# Patient Record
Sex: MALE | Race: WHITE | NOT HISPANIC OR LATINO | Employment: OTHER | URBAN - METROPOLITAN AREA
[De-identification: names, ages, dates, MRNs, and addresses within clinical notes are randomized per-mention and may not be internally consistent; named-entity substitution may affect disease eponyms.]

---

## 2021-11-02 ENCOUNTER — OFFICE VISIT (OUTPATIENT)
Dept: PODIATRY | Facility: CLINIC | Age: 21
End: 2021-11-02

## 2021-11-02 VITALS
TEMPERATURE: 97.3 F | DIASTOLIC BLOOD PRESSURE: 92 MMHG | BODY MASS INDEX: 31.08 KG/M2 | SYSTOLIC BLOOD PRESSURE: 109 MMHG | OXYGEN SATURATION: 100 % | WEIGHT: 193.4 LBS | HEIGHT: 66 IN | HEART RATE: 88 BPM

## 2021-11-02 DIAGNOSIS — M79.671 FOOT PAIN, RIGHT: Primary | ICD-10-CM

## 2021-11-02 DIAGNOSIS — L03.031 PARONYCHIA OF TOE OF RIGHT FOOT: ICD-10-CM

## 2021-11-02 DIAGNOSIS — L60.0 ONYCHOCRYPTOSIS: ICD-10-CM

## 2021-11-02 PROCEDURE — 99203 OFFICE O/P NEW LOW 30 MIN: CPT | Performed by: PODIATRIST

## 2021-11-02 PROCEDURE — 11730 AVULSION NAIL PLATE SIMPLE 1: CPT | Performed by: PODIATRIST

## 2021-11-02 NOTE — PROGRESS NOTES
[facility] - PODIATRY    Today's Date: 11/02/21    Patient Name: Juan Carlos Hill  MRN: [id number]  CSN: [id number]  PCP: Tricia Horan APRN, last PCP visit: Early October 2021  Referring Provider: No ref. provider found    SUBJECTIVE     Chief Complaint   Patient presents with   • Right Foot - Ingrown Toenail     HPI: Juan Carlos Hill, a 21 y.o.male, comes to clinic.    New, Established, New Problem:  New  Location:  Medial or lateral borders of right first toe  Duration:   October 2021  Onset:  Gradual  Nature:  sore with palpation.  Stable, worsening, improving:   Worsening  Aggravating factors:  Pain with shoe gear and ambulation.  Previous Treatment:  Completed p.o. antibiotics    No other pedal complaints at this time.    Patient's had a stroke and presents with his father.  Patient works at [employer].    Patient denies any fevers, chills, nausea, vomiting, shortness of breath, nor any other constitutional signs nor symptoms.       Past Medical History:   Diagnosis Date   • Hydrocephalus (HCC)    • Ingrown toenail    • Seizures (HCC)    • Stroke (HCC)      Past Surgical History:   Procedure Laterality Date   • CSF SHUNT       Family History   Problem Relation Age of Onset   • Cancer Paternal Grandmother    • Cancer Paternal Grandfather      Social History     Socioeconomic History   • Marital status: Single   Tobacco Use   • Smoking status: Never Smoker   • Smokeless tobacco: Never Used   Vaping Use   • Vaping Use: Never used   Substance and Sexual Activity   • Alcohol use: Never   • Drug use: Never   • Sexual activity: Defer     No Known Allergies  No current outpatient medications on file.     No current facility-administered medications for this visit.     Review of Systems   Constitutional: Negative.    Skin:        Painful Right in-grown toenail   All other systems reviewed and are negative.      OBJECTIVE     Vitals:    11/02/21 1039   BP: 109/92   Pulse: 88   Temp:  97.3 °F (36.3 °C)   SpO2: 100%       PHYSICAL EXAM    Accompanied by father.     Foot/Ankle Exam:       General:   Appearance: appears stated age and healthy    Orientation: AAOx3    Affect: appropriate    Gait: unimpaired      VASCULAR      Right Foot Vascularity   Normal vascular exam    Dorsalis pedis:  2+  Posterior tibial:  2+  Skin Temperature: warm    Edema Grading:  None  CFT:  < 3 seconds  Pedal Hair Growth:  Present  Varicosities: none       Left Foot Vascularity   Normal vascular exam    Dorsalis pedis:  2+  Posterior tibial:  2+  Skin Temperature: warm    Edema Grading:  None  CFT:  < 3 seconds  Pedal Hair Growth:  Present  Varicosities: none        NEUROLOGIC     Right Foot Neurologic   Normal sensation    Light touch sensation:  Normal  Vibratory sensation:  Normal  Hot/Cold sensation: normal       Left Foot Neurologic   Normal sensation    Light touch sensation:  Normal  Vibratory sensation:  Normal  Hot/cold sensation: normal       MUSCLE STRENGTH     Right Foot Muscle Strength   Normal strength    Foot dorsiflexion:  5  Foot plantar flexion:  5  Foot inversion:  5  Foot eversion:  5     Left Foot Muscle Strength   Normal strength    Foot dorsiflexion:  5  Foot plantar flexion:  5  Foot inversion:  5  Foot eversion:  5     RANGE OF MOTION      Right Foot Range of Motion   Foot and ankle ROM within normal limits       Left Foot Range of Motion   Foot and ankle ROM within normal limits       DERMATOLOGIC     Right Foot Dermatologic   Skin: skin intact    Nails: ingrown toenail and paronychia    Nails comment:  Right 1st bilateral nail borders     Left Foot Dermatologic   Skin: skin intact    Nails: normal        ASSESSMENT/PLAN     Diagnoses and all orders for this visit:    1. Foot pain, right (Primary)    2. Onychocryptosis    3. Paronychia of toe of right foot      Comprehensive lower extremity examination and evaluation was performed.    Discussed findings and treatment plan including risks,  benefits, and treatment options with patient in detail. Patient agreed with treatment plan.    Incision and Drainage procedure is indicated for onychocryptosis of the Right 1st medial and lateral nail border(s). Indications, risks and benefits and alternative treatments have been discussed with this patient who has agreed to this procedure. The area was sterilely prepped with a povidone-iodine solution. The affected area was locally anesthetized with 3ml, of lidocaine 1%. The offending nail plate was completely excised.  This was followed by irrigation with copious amounts of isopropyl alcohol.  A sterile dressing was applied. The patient tolerated the procedure well.     Patient was given post procedure care instructions.  The patient states understanding and agreement with this plan.    An After Visit Summary was printed and given to the patient at discharge, including (if requested) any available informative/educational handouts regarding diagnosis, treatment, or medications. All questions were answered to patient/family satisfaction. Should symptoms fail to improve or worsen they agree to call or return to clinic or to go to the Emergency Department. Discussed the importance of following up with any needed screening tests/labs/specialist appointments and any requested follow-up recommended by me today. Importance of maintaining follow-up discussed and patient accepts that missed appointments can delay diagnosis and potentially lead to worsening of conditions.    Return in about 2 weeks (around 11/16/2021) for P & A procedure., or sooner if acute issues arise.    This document has been electronically signed by Hernandez Negron DPM on November 2, 2021 11:08 EDT

## 2021-11-18 ENCOUNTER — OFFICE VISIT (OUTPATIENT)
Dept: PODIATRY | Facility: CLINIC | Age: 21
End: 2021-11-18

## 2021-11-18 VITALS
HEIGHT: 66 IN | SYSTOLIC BLOOD PRESSURE: 105 MMHG | OXYGEN SATURATION: 99 % | DIASTOLIC BLOOD PRESSURE: 51 MMHG | BODY MASS INDEX: 31.18 KG/M2 | HEART RATE: 55 BPM | TEMPERATURE: 97.3 F | WEIGHT: 194 LBS

## 2021-11-18 DIAGNOSIS — L03.031 PARONYCHIA OF TOE OF RIGHT FOOT: ICD-10-CM

## 2021-11-18 DIAGNOSIS — M79.671 FOOT PAIN, RIGHT: Primary | ICD-10-CM

## 2021-11-18 DIAGNOSIS — L60.0 ONYCHOCRYPTOSIS: ICD-10-CM

## 2021-11-18 PROCEDURE — 11750 EXCISION NAIL&NAIL MATRIX: CPT | Performed by: PODIATRIST

## 2021-11-18 RX ORDER — LEVETIRACETAM 500 MG/1
500 TABLET ORAL 2 TIMES DAILY
COMMUNITY
Start: 2021-10-18

## 2021-11-18 RX ORDER — PYRIDOXINE HCL (VITAMIN B6) 25 MG
50 TABLET ORAL DAILY
COMMUNITY

## 2021-11-18 NOTE — PROGRESS NOTES
[facility]    Today's Date: 11/18/21    Patient Name: Juan Carlos Hill  MRN: [id number]  CSN: [id number]  PCP: Tricia Horan APRN  Referring Provider: No ref. provider found    SUBJECTIVE     Chief Complaint   Patient presents with   • Right Foot - Ingrown Toenail     Had I&D, today P&A     HPI: Juan Carlos Hill, a 21 y.o.male, comes to clinic.    New, Established, New Problem:  Established  Location:  Medial lateral borders of right first toe  Duration:   Greater than 1 month  Onset:  Gradual  Nature:  sore with palpation.  Stable, worsening, improving:   Improving  Aggravating factors:  Pain with shoe gear and ambulation.  Previous Treatment:  I&D on last visit    No other pedal complaints at this time.    Patient denies any fevers, chills, nausea, vomiting, shortness of breath, nor any other constitutional signs nor symptoms.       Past Medical History:   Diagnosis Date   • Hydrocephalus (HCC)    • Ingrown toenail    • Seizures (HCC)    • Stroke (HCC)      Past Surgical History:   Procedure Laterality Date   • CSF SHUNT       Family History   Problem Relation Age of Onset   • Cancer Paternal Grandmother    • Cancer Paternal Grandfather      Social History     Socioeconomic History   • Marital status: Single   Tobacco Use   • Smoking status: Never Smoker   • Smokeless tobacco: Never Used   Vaping Use   • Vaping Use: Never used   Substance and Sexual Activity   • Alcohol use: Never   • Drug use: Never   • Sexual activity: Defer     No Known Allergies  Current Outpatient Medications   Medication Sig Dispense Refill   • levETIRAcetam (KEPPRA) 500 MG tablet Take 500 mg by mouth 2 (Two) Times a Day.     • pyridoxine (VITAMIN B-6) 25 MG tablet Take 50 mg by mouth Daily.       No current facility-administered medications for this visit.     Review of Systems   Constitutional: Negative.    Skin:        Painful Right in-grown toenail   All other systems reviewed and are  negative.      OBJECTIVE     Vitals:    11/18/21 0743   BP: 105/51   Pulse: 55   Temp: 97.3 °F (36.3 °C)   SpO2: 99%       PHYSICAL EXAM  GEN:   Accompanied by father.     Foot/Ankle Exam:       General:   Appearance: appears stated age and healthy    Affect: appropriate    Gait: unimpaired      VASCULAR      Right Foot Vascularity   Normal vascular exam    Dorsalis pedis:  2+  Posterior tibial:  2+  Skin Temperature: warm    Edema Grading:  None  CFT:  < 3 seconds  Pedal Hair Growth:  Present  Varicosities: none       Left Foot Vascularity   Normal vascular exam    Dorsalis pedis:  2+  Posterior tibial:  2+  Skin Temperature: warm    Edema Grading:  None  CFT:  < 3 seconds  Pedal Hair Growth:  Present  Varicosities: none        NEUROLOGIC     Right Foot Neurologic   Normal sensation    Light touch sensation:  Normal  Vibratory sensation:  Normal  Hot/Cold sensation: normal       Left Foot Neurologic   Normal sensation    Light touch sensation:  Normal  Vibratory sensation:  Normal  Hot/cold sensation: normal       MUSCLE STRENGTH     Right Foot Muscle Strength   Normal strength    Foot dorsiflexion:  5  Foot plantar flexion:  5  Foot inversion:  5  Foot eversion:  5     Left Foot Muscle Strength   Normal strength    Foot dorsiflexion:  5  Foot plantar flexion:  5  Foot inversion:  5  Foot eversion:  5     RANGE OF MOTION      Right Foot Range of Motion   Foot and ankle ROM within normal limits       Left Foot Range of Motion   Foot and ankle ROM within normal limits       DERMATOLOGIC     Right Foot Dermatologic   Skin: skin intact    Nails: ingrown toenail and paronychia    Nails comment:  Right 1st bilateral nail borders     Left Foot Dermatologic   Skin: skin intact    Nails: normal        ASSESSMENT/PLAN     Diagnoses and all orders for this visit:    1. Foot pain, right (Primary)    2. Onychocryptosis    3. Paronychia of toe of right foot      Comprehensive lower extremity examination and evaluation was  performed.    Discussed findings and treatment plan including risks, benefits, and treatment options with patient in detail. Patient agreed with treatment plan.    Phenol and Alcohol Chemical Matrixectomy Procedure - This procedure is indicated for onychocryptosis of the right first and medial nail border(s). Indications, risks and benefits and alternative treatments have been discussed with this patient who has agreed to this procedure. The area was sterilely prepped with a povidone-iodine solution. The affected area was locally anesthetized with 3 ml, of lidocaine 1%. The offending nail plate was completely excised.  Next 3 applications of 89% phenol were applied to the matrix area x 30 seconds followed by irrigation with copious amounts of isopropyl alcohol.  A sterile dressing was applied. The patient tolerated the procedure well.     Patient was given post procedure care instructions.  The patient states understanding and agreement with this plan.    An After Visit Summary was printed and given to the patient at discharge, including (if requested) any available informative/educational handouts regarding diagnosis, treatment, or medications. All questions were answered to patient/family satisfaction. Should symptoms fail to improve or worsen they agree to call or return to clinic or to go to the Emergency Department. Discussed the importance of following up with any needed screening tests/labs/specialist appointments and any requested follow-up recommended by me today. Importance of maintaining follow-up discussed and patient accepts that missed appointments can delay diagnosis and potentially lead to worsening of conditions.    Return in about 2 weeks (around 12/2/2021) for Post-Procedure., or sooner if acute issues arise.    This document has been electronically signed by Hernandez Negron DPM on November 18, 2021 08:04 EST

## 2021-12-06 ENCOUNTER — OFFICE VISIT (OUTPATIENT)
Dept: PODIATRY | Facility: CLINIC | Age: 21
End: 2021-12-06

## 2021-12-06 VITALS
TEMPERATURE: 97.1 F | DIASTOLIC BLOOD PRESSURE: 63 MMHG | BODY MASS INDEX: 31.02 KG/M2 | HEIGHT: 66 IN | OXYGEN SATURATION: 98 % | WEIGHT: 193 LBS | SYSTOLIC BLOOD PRESSURE: 126 MMHG | HEART RATE: 72 BPM

## 2021-12-06 DIAGNOSIS — M79.671 FOOT PAIN, RIGHT: Primary | ICD-10-CM

## 2021-12-06 DIAGNOSIS — L03.031 PARONYCHIA OF TOE OF RIGHT FOOT: ICD-10-CM

## 2021-12-06 DIAGNOSIS — L60.0 ONYCHOCRYPTOSIS: ICD-10-CM

## 2021-12-06 PROCEDURE — 99212 OFFICE O/P EST SF 10 MIN: CPT | Performed by: PODIATRIST

## 2021-12-06 NOTE — PROGRESS NOTES
[facility] - PODIATRY    Today's Date: 12/06/21    Patient Name: Juan Carlos Hill  MRN: [id number]  CSN: [id number]  PCP: Tricia Horan APRN  Referring Provider: No ref. provider found    SUBJECTIVE     Chief Complaint   Patient presents with   • Right Foot - Follow-up, Ingrown Toenail     HPI: Juan Carlos Hill, a 21 y.o.male, comes to clinic.    New, Established, New Problem:  Established  Location:  Medial lateral borders of right first toe  Duration:   Greater than 1 month  Onset:  Gradual  Nature:  sore with palpation.  Stable, worsening, improving:   Improving  Aggravating factors:  Pain with shoe gear and ambulation.  Previous Treatment:  Chemical matrixectomy    No other pedal complaints at this time.    Patient denies any fevers, chills, nausea, vomiting, shortness of breath, nor any other constitutional signs nor symptoms.       Past Medical History:   Diagnosis Date   • Hydrocephalus (HCC)    • Ingrown toenail    • Seizures (HCC)    • Stroke (HCC)      Past Surgical History:   Procedure Laterality Date   • CSF SHUNT       Family History   Problem Relation Age of Onset   • Cancer Paternal Grandmother    • Cancer Paternal Grandfather      Social History     Socioeconomic History   • Marital status: Single   Tobacco Use   • Smoking status: Never Smoker   • Smokeless tobacco: Never Used   Vaping Use   • Vaping Use: Never used   Substance and Sexual Activity   • Alcohol use: Never   • Drug use: Never   • Sexual activity: Defer     No Known Allergies  Current Outpatient Medications   Medication Sig Dispense Refill   • levETIRAcetam (KEPPRA) 500 MG tablet Take 500 mg by mouth 2 (Two) Times a Day.     • pyridoxine (VITAMIN B-6) 25 MG tablet Take 50 mg by mouth Daily.       No current facility-administered medications for this visit.     Review of Systems   Constitutional: Negative.    Skin:        Right in-grown toenail   All other systems reviewed and are negative.      OBJECTIVE      Vitals:    12/06/21 0736   BP: 126/63   Pulse: 72   Temp: 97.1 °F (36.2 °C)   SpO2: 98%       PHYSICAL EXAM  GEN:   Accompanied by father.     Foot/Ankle Exam:       General:   Appearance: appears stated age and healthy    Affect: appropriate    Gait: unimpaired      VASCULAR      Right Foot Vascularity   Normal vascular exam    Dorsalis pedis:  2+  Posterior tibial:  2+  Skin Temperature: warm    Edema Grading:  None  CFT:  < 3 seconds  Pedal Hair Growth:  Present  Varicosities: none       Left Foot Vascularity   Normal vascular exam    Dorsalis pedis:  2+  Posterior tibial:  2+  Skin Temperature: warm    Edema Grading:  None  CFT:  < 3 seconds  Pedal Hair Growth:  Present  Varicosities: none        NEUROLOGIC     Right Foot Neurologic   Normal sensation    Light touch sensation:  Normal  Vibratory sensation:  Normal  Hot/Cold sensation: normal       Left Foot Neurologic   Normal sensation    Light touch sensation:  Normal  Vibratory sensation:  Normal  Hot/cold sensation: normal       MUSCLE STRENGTH     Right Foot Muscle Strength   Normal strength    Foot dorsiflexion:  5  Foot plantar flexion:  5  Foot inversion:  5  Foot eversion:  5     Left Foot Muscle Strength   Normal strength    Foot dorsiflexion:  5  Foot plantar flexion:  5  Foot inversion:  5  Foot eversion:  5     RANGE OF MOTION      Right Foot Range of Motion   Foot and ankle ROM within normal limits       Left Foot Range of Motion   Foot and ankle ROM within normal limits       DERMATOLOGIC     Right Foot Dermatologic   Skin: skin intact    Nails: no ingrown toenail and no paronychia    Nails comment:  Right 1st bilateral nail borders; healing without complications.     Left Foot Dermatologic   Skin: skin intact    Nails: normal        ASSESSMENT/PLAN     Diagnoses and all orders for this visit:    1. Foot pain, right (Primary)    2. Onychocryptosis    3. Paronychia of toe of right foot      Comprehensive lower extremity examination and  evaluation was performed.    Patient is to monitor for recurrence and any new symptoms and to contact Dr. Negron's office for a follow-up appointment.      The patient states understanding and agreement with this plan.    An After Visit Summary was printed and given to the patient at discharge, including (if requested) any available informative/educational handouts regarding diagnosis, treatment, or medications. All questions were answered to patient/family satisfaction. Should symptoms fail to improve or worsen they agree to call or return to clinic or to go to the Emergency Department. Discussed the importance of following up with any needed screening tests/labs/specialist appointments and any requested follow-up recommended by me today. Importance of maintaining follow-up discussed and patient accepts that missed appointments can delay diagnosis and potentially lead to worsening of conditions.    Return if symptoms worsen or fail to improve., or sooner if acute issues arise.    This document has been electronically signed by Hernandez Negron DPM on December 6, 2021 07:56 EST

## 2023-05-30 ENCOUNTER — APPOINTMENT (OUTPATIENT)
Dept: GENERAL RADIOLOGY | Facility: HOSPITAL | Age: 23
End: 2023-05-30

## 2023-05-30 ENCOUNTER — HOSPITAL ENCOUNTER (EMERGENCY)
Facility: HOSPITAL | Age: 23
Discharge: ANOTHER HOSPITAL | End: 2023-05-30
Attending: EMERGENCY MEDICINE | Admitting: EMERGENCY MEDICINE

## 2023-05-30 ENCOUNTER — APPOINTMENT (OUTPATIENT)
Dept: CT IMAGING | Facility: HOSPITAL | Age: 23
End: 2023-05-30

## 2023-05-30 VITALS
HEIGHT: 66 IN | RESPIRATION RATE: 18 BRPM | SYSTOLIC BLOOD PRESSURE: 115 MMHG | HEART RATE: 77 BPM | OXYGEN SATURATION: 97 % | WEIGHT: 192.9 LBS | DIASTOLIC BLOOD PRESSURE: 71 MMHG | TEMPERATURE: 97.8 F | BODY MASS INDEX: 31 KG/M2

## 2023-05-30 DIAGNOSIS — R26.9 GAIT ABNORMALITY: ICD-10-CM

## 2023-05-30 DIAGNOSIS — H53.9 VISION CHANGES: ICD-10-CM

## 2023-05-30 DIAGNOSIS — Z98.2 VP (VENTRICULOPERITONEAL) SHUNT STATUS: Primary | ICD-10-CM

## 2023-05-30 LAB
ABO GROUP BLD: NORMAL
ABO GROUP BLD: NORMAL
ALBUMIN SERPL-MCNC: 4.1 G/DL (ref 3.5–5.2)
ALBUMIN/GLOB SERPL: 1.3 G/DL
ALP SERPL-CCNC: 133 U/L (ref 39–117)
ALT SERPL W P-5'-P-CCNC: 15 U/L (ref 1–41)
ANION GAP SERPL CALCULATED.3IONS-SCNC: 10.5 MMOL/L (ref 5–15)
APTT PPP: 29.3 SECONDS (ref 24.2–34.2)
AST SERPL-CCNC: 13 U/L (ref 1–40)
BASOPHILS # BLD AUTO: 0.03 10*3/MM3 (ref 0–0.2)
BASOPHILS NFR BLD AUTO: 0.3 % (ref 0–1.5)
BILIRUB SERPL-MCNC: 0.3 MG/DL (ref 0–1.2)
BLD GP AB SCN SERPL QL: NEGATIVE
BUN SERPL-MCNC: 10 MG/DL (ref 6–20)
BUN/CREAT SERPL: 11.6 (ref 7–25)
CALCIUM SPEC-SCNC: 9.3 MG/DL (ref 8.6–10.5)
CHLORIDE SERPL-SCNC: 99 MMOL/L (ref 98–107)
CO2 SERPL-SCNC: 26.5 MMOL/L (ref 22–29)
CREAT SERPL-MCNC: 0.86 MG/DL (ref 0.76–1.27)
DEPRECATED RDW RBC AUTO: 40.6 FL (ref 37–54)
EGFRCR SERPLBLD CKD-EPI 2021: 123.2 ML/MIN/1.73
EOSINOPHIL # BLD AUTO: 0.14 10*3/MM3 (ref 0–0.4)
EOSINOPHIL NFR BLD AUTO: 1.3 % (ref 0.3–6.2)
ERYTHROCYTE [DISTWIDTH] IN BLOOD BY AUTOMATED COUNT: 12.9 % (ref 12.3–15.4)
GLOBULIN UR ELPH-MCNC: 3.2 GM/DL
GLUCOSE BLDC GLUCOMTR-MCNC: 121 MG/DL (ref 70–99)
GLUCOSE SERPL-MCNC: 99 MG/DL (ref 65–99)
HCT VFR BLD AUTO: 43.5 % (ref 37.5–51)
HGB BLD-MCNC: 14.9 G/DL (ref 13–17.7)
HOLD SPECIMEN: NORMAL
HOLD SPECIMEN: NORMAL
IMM GRANULOCYTES # BLD AUTO: 0.04 10*3/MM3 (ref 0–0.05)
IMM GRANULOCYTES NFR BLD AUTO: 0.4 % (ref 0–0.5)
INR PPP: 1.03 (ref 0.86–1.15)
LYMPHOCYTES # BLD AUTO: 1.38 10*3/MM3 (ref 0.7–3.1)
LYMPHOCYTES NFR BLD AUTO: 12.4 % (ref 19.6–45.3)
MCH RBC QN AUTO: 29.6 PG (ref 26.6–33)
MCHC RBC AUTO-ENTMCNC: 34.3 G/DL (ref 31.5–35.7)
MCV RBC AUTO: 86.5 FL (ref 79–97)
MONOCYTES # BLD AUTO: 1.01 10*3/MM3 (ref 0.1–0.9)
MONOCYTES NFR BLD AUTO: 9.1 % (ref 5–12)
NEUTROPHILS NFR BLD AUTO: 76.5 % (ref 42.7–76)
NEUTROPHILS NFR BLD AUTO: 8.53 10*3/MM3 (ref 1.7–7)
NRBC BLD AUTO-RTO: 0 /100 WBC (ref 0–0.2)
PLATELET # BLD AUTO: 370 10*3/MM3 (ref 140–450)
PMV BLD AUTO: 8.1 FL (ref 6–12)
POTASSIUM SERPL-SCNC: 3.7 MMOL/L (ref 3.5–5.2)
PROT SERPL-MCNC: 7.3 G/DL (ref 6–8.5)
PROTHROMBIN TIME: 13.6 SECONDS (ref 11.8–14.9)
RBC # BLD AUTO: 5.03 10*6/MM3 (ref 4.14–5.8)
RH BLD: NEGATIVE
RH BLD: NEGATIVE
SODIUM SERPL-SCNC: 136 MMOL/L (ref 136–145)
T&S EXPIRATION DATE: NORMAL
TROPONIN T SERPL HS-MCNC: 6 NG/L
WBC NRBC COR # BLD: 11.13 10*3/MM3 (ref 3.4–10.8)
WHOLE BLOOD HOLD COAG: NORMAL
WHOLE BLOOD HOLD SPECIMEN: NORMAL

## 2023-05-30 PROCEDURE — 85025 COMPLETE CBC W/AUTO DIFF WBC: CPT | Performed by: EMERGENCY MEDICINE

## 2023-05-30 PROCEDURE — 70250 X-RAY EXAM OF SKULL: CPT

## 2023-05-30 PROCEDURE — 86901 BLOOD TYPING SEROLOGIC RH(D): CPT | Performed by: EMERGENCY MEDICINE

## 2023-05-30 PROCEDURE — 74018 RADEX ABDOMEN 1 VIEW: CPT

## 2023-05-30 PROCEDURE — 85730 THROMBOPLASTIN TIME PARTIAL: CPT | Performed by: EMERGENCY MEDICINE

## 2023-05-30 PROCEDURE — 93005 ELECTROCARDIOGRAM TRACING: CPT | Performed by: EMERGENCY MEDICINE

## 2023-05-30 PROCEDURE — 82948 REAGENT STRIP/BLOOD GLUCOSE: CPT

## 2023-05-30 PROCEDURE — 86900 BLOOD TYPING SEROLOGIC ABO: CPT | Performed by: EMERGENCY MEDICINE

## 2023-05-30 PROCEDURE — 71046 X-RAY EXAM CHEST 2 VIEWS: CPT

## 2023-05-30 PROCEDURE — 85610 PROTHROMBIN TIME: CPT | Performed by: EMERGENCY MEDICINE

## 2023-05-30 PROCEDURE — 84484 ASSAY OF TROPONIN QUANT: CPT | Performed by: EMERGENCY MEDICINE

## 2023-05-30 PROCEDURE — 70450 CT HEAD/BRAIN W/O DYE: CPT

## 2023-05-30 PROCEDURE — 80053 COMPREHEN METABOLIC PANEL: CPT | Performed by: EMERGENCY MEDICINE

## 2023-05-30 PROCEDURE — 86850 RBC ANTIBODY SCREEN: CPT | Performed by: EMERGENCY MEDICINE

## 2023-05-30 PROCEDURE — 99285 EMERGENCY DEPT VISIT HI MDM: CPT

## 2023-05-30 RX ORDER — SODIUM CHLORIDE 0.9 % (FLUSH) 0.9 %
10 SYRINGE (ML) INJECTION AS NEEDED
Status: DISCONTINUED | OUTPATIENT
Start: 2023-05-30 | End: 2023-05-30 | Stop reason: HOSPADM

## 2023-05-30 NOTE — ED PROVIDER NOTES
"Time: 7:43 AM EDT  Date of encounter:  5/30/2023  Independent Historian/ClinHistory is limited by: Cognitive Impairment    Chief complaint: Gait imbalance and vision changes.    Historians: Patient and his father    History of Present Illness:  Patient is a 23 y.o. year old male who presents to the emergency department for evaluation of worsening gait imbalance since yesterday morning, vision changes today.  Most of the history is relayed by the father due to patient being cognitively impaired.  Dad states that patient is \"a little wobbly all the time\", however since waking up yesterday morning has greater than 80% worsening of his gait and balance.  Then this morning while brushing his teeth patient notes bilateral vision changes.  History very much limited aside from these 2 history points.  Dad does state he feels his son has not been medically sick in any way in the last few days, specifically denying fever, cough, vomiting or diarrhea.    HPI    Patient Care Team  Primary Care Provider: Provider, Tiesha Known    Past Medical History:     No Known Allergies  Past Medical History:   Diagnosis Date   • Hydrocephalus    • Ingrown toenail    • Seizures    • Stroke      Past Surgical History:   Procedure Laterality Date   • CSF SHUNT       Family History   Problem Relation Age of Onset   • Cancer Paternal Grandmother    • Cancer Paternal Grandfather        Home Medications:  Prior to Admission medications    Medication Sig Start Date End Date Taking? Authorizing Provider   levETIRAcetam (KEPPRA) 500 MG tablet Take 500 mg by mouth 2 (Two) Times a Day. 10/18/21   ProviderElise MD   pyridoxine (VITAMIN B-6) 25 MG tablet Take 50 mg by mouth Daily.    ProviderElise MD        Social History:   Social History     Tobacco Use   • Smoking status: Never   • Smokeless tobacco: Never   Vaping Use   • Vaping Use: Never used   Substance Use Topics   • Alcohol use: Never   • Drug use: Never         Review of " "Systems:  Review of Systems   Constitutional: Negative for fever.   Eyes: Positive for visual disturbance.   Respiratory: Negative for cough.    Gastrointestinal: Negative for diarrhea and vomiting.   Neurological: Positive for light-headedness.        Physical Exam:  /78   Pulse 77   Temp 97.8 °F (36.6 °C) (Oral)   Resp 18   Ht 167.6 cm (66\")   Wt 87.5 kg (192 lb 14.4 oz)   SpO2 97%   BMI 31.14 kg/m²     Physical Exam  Vitals and nursing note reviewed.   Constitutional:       General: He is awake.      Appearance: Normal appearance.   HENT:      Head: Normocephalic and atraumatic.      Nose: Nose normal.      Mouth/Throat:      Mouth: Mucous membranes are moist.   Eyes:      Extraocular Movements: Extraocular movements intact.      Pupils: Pupils are equal, round, and reactive to light.   Cardiovascular:      Rate and Rhythm: Normal rate and regular rhythm.      Heart sounds: Normal heart sounds.   Pulmonary:      Effort: Pulmonary effort is normal. No respiratory distress.      Breath sounds: Normal breath sounds. No wheezing, rhonchi or rales.   Abdominal:      General: Bowel sounds are normal.      Palpations: Abdomen is soft.      Tenderness: There is no abdominal tenderness. There is no guarding or rebound.      Comments: No rigidity   Musculoskeletal:         General: No tenderness. Normal range of motion.      Cervical back: Normal range of motion and neck supple.   Skin:     General: Skin is warm and dry.      Coloration: Skin is not jaundiced.   Neurological:      General: No focal deficit present.      Mental Status: He is alert.      Sensory: Sensation is intact.      Motor: Motor function is intact.      Coordination: Coordination is intact.   Psychiatric:         Attention and Perception: Attention and perception normal.         Mood and Affect: Affect normal.         Speech: Speech normal.                  Procedures:  Procedures      Medical Decision Making:      Comorbidities that " affect care:    CVA in 2017.  Seizures, hydrocephalus,  shunt    External Notes reviewed:    Encounter review: Neurosurgery office visit 6/22/2022.  [facility].  Description: Abnormality of gait      The following orders were placed and all results were independently analyzed by me:  Orders Placed This Encounter   Procedures   • CT Head Without Contrast Stroke Protocol   • XR Shunt Series   • MRI Brain Without Contrast   • [place] Draw   • Comprehensive Metabolic Panel   • Protime-INR   • aPTT   • Single High Sensitivity Troponin T   • Urinalysis With Microscopic If Indicated (No Culture) - Urine, Clean Catch   • CBC Auto Differential   • NPO Diet NPO Type: Strict NPO   • Initiate Department's Acute Stroke Process (Team D, Code 19, etc)   • Perform NIH Stroke Scale   • Measure Actual Weight   • Head of Bed 30 Degrees or Less   • Undress and Gown   • Continuous Pulse Oximetry   • Vital Signs   • Neuro Checks   • Notify MD for SBP < 80 or > 200   • Notify Provider for SBP greater than 140 if hemorrhagic Stroke   • No Hypotonic Fluids   • Nursing Dysphagia Screening (Complete Prior to Giving anything PO)   • RN to Place Order SLP Consult (IF swallow screen failed) - Eval & Treat Choosing Reason of RN Dysphagia Screen Failed   • IP General Consult (Use specialty-specific consult if known)   • Oxygen Therapy- Nasal Cannula; Titrate 1-6 LPM Per SpO2; 90 - 95%   • POC Glucose Once   • POC Glucose Once   • ECG 12 Lead ED Triage Standing Order; Acute Stroke (Onset <12 hrs)   • Type & Screen   • ABO RH Specimen Verification   • Insert Large Bore Peripheral IV - Right AC Preferred   • CBC & Differential   • Green Top (Gel)   • Lavender Top   • Gold Top - SST   • Light Blue Top       Medications Given in the Emergency Department:  Medications   sodium chloride 0.9 % flush 10 mL (has no administration in time range)        ED Course:    ED Course as of 05/30/23 0921   Tue May 30, 2023   0915 Case discussed with [facility]TM[facility]  neurosurgery on-call, Dr. Samayoa.  She does recommend transfer. [RP]   0920 Potassium: 3.7 [RP]   0920 I have personally interpreted the EKG today and it shows no evidence of any acute ischemia or heart arrhythmia. [RP]      ED Course User Index  [RP] Javy Mckeon MD       Labs:    Lab Results (last 24 hours)     Procedure Component Value Units Date/Time    POC Glucose Once [[id number]]  (Abnormal) Collected: 05/30/23 0656    Specimen: Blood Updated: 05/30/23 0657     Glucose 121 mg/dL      Comment: Serial Number: [id number]:  [id number]       CBC & Differential [[id number]]  (Abnormal) Collected: 05/30/23 0801    Specimen: Blood Updated: 05/30/23 0851    Narrative:      The following orders were created for panel order CBC & Differential.  Procedure                               Abnormality         Status                     ---------                               -----------         ------                     CBC Auto Differential[[id number]]        Abnormal            Final result                 Please view results for these tests on the individual orders.    Comprehensive Metabolic Panel [[id number]]  (Abnormal) Collected: 05/30/23 0801    Specimen: Blood Updated: 05/30/23 0912     Glucose 99 mg/dL      BUN 10 mg/dL      Creatinine 0.86 mg/dL      Sodium 136 mmol/L      Potassium 3.7 mmol/L      Chloride 99 mmol/L      CO2 26.5 mmol/L      Calcium 9.3 mg/dL      Total Protein 7.3 g/dL      Albumin 4.1 g/dL      ALT (SGPT) 15 U/L      AST (SGOT) 13 U/L      Alkaline Phosphatase 133 U/L      Total Bilirubin 0.3 mg/dL      Globulin 3.2 gm/dL      A/G Ratio 1.3 g/dL      BUN/Creatinine Ratio 11.6     Anion Gap 10.5 mmol/L      eGFR 123.2 mL/min/1.73     Narrative:      GFR Normal >60  Chronic Kidney Disease <60  Kidney Failure <15      Protime-INR [[id number]]  (Normal) Collected: 05/30/23 0801    Specimen: Blood Updated: 05/30/23 0900     Protime 13.6 Seconds      INR 1.03    Narrative:      Suggested  Therapeutic Ranges For Oral Anticoagulant Therapy:  Level of Therapy                      INR Target Range  Standard Dose                            2.0-3.0  High Dose                                2.5-3.5  Patients not receiving anticoagulant  Therapy Normal Range                     0.86-1.15    aPTT [[id number]]  (Normal) Collected: 05/30/23 0801    Specimen: Blood Updated: 05/30/23 0900     PTT 29.3 seconds     Single High Sensitivity Troponin T [[id number]]  (Normal) Collected: 05/30/23 0801    Specimen: Blood Updated: 05/30/23 0912     HS Troponin T 6 ng/L     Narrative:      High Sensitive Troponin T Reference Range:  <10.0 ng/L- Negative Female for AMI  <15.0 ng/L- Negative Male for AMI  >=10 - Abnormal Female indicating possible myocardial injury.  >=15 - Abnormal Male indicating possible myocardial injury.   Clinicians would have to utilize clinical acumen, EKG, Troponin, and serial changes to determine if it is an Acute Myocardial Infarction or myocardial injury due to an underlying chronic condition.         CBC Auto Differential [[id number]]  (Abnormal) Collected: 05/30/23 0801    Specimen: Blood Updated: 05/30/23 0851     WBC 11.13 10*3/mm3      RBC 5.03 10*6/mm3      Hemoglobin 14.9 g/dL      Hematocrit 43.5 %      MCV 86.5 fL      MCH 29.6 pg      MCHC 34.3 g/dL      RDW 12.9 %      RDW-SD 40.6 fl      MPV 8.1 fL      Platelets 370 10*3/mm3      Neutrophil % 76.5 %      Lymphocyte % 12.4 %      Monocyte % 9.1 %      Eosinophil % 1.3 %      Basophil % 0.3 %      Immature Grans % 0.4 %      Neutrophils, Absolute 8.53 10*3/mm3      Lymphocytes, Absolute 1.38 10*3/mm3      Monocytes, Absolute 1.01 10*3/mm3      Eosinophils, Absolute 0.14 10*3/mm3      Basophils, Absolute 0.03 10*3/mm3      Immature Grans, Absolute 0.04 10*3/mm3      nRBC 0.0 /100 WBC            Imaging:    CT Head Without Contrast Stroke Protocol    Result Date: 5/30/2023  PROCEDURE: CT HEAD WO CONTRAST STROKE PROTOCOL   COMPARISON: 10/21/2015.  INDICATIONS: STROKE INTERVENTION WITH CONFUSION.  PROTOCOL:   Standard CT imaging protocol performed.    RADIATION:   Total DLP: 1,018.2mGy*cm.   MA and/or KV were/was adjusted to minimize radiation dose.    TECHNIQUE:  After obtaining the patient's consent, 130 CT images were obtained without non-ionic intravenous contrast material.  FINDINGS:  There is moderate-to-marked ventriculomegaly, probably similar to the prior study.  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  There is a ventriculoperitoneal () shunt in place with its proximal limb entering from a right parietal approach.  Its distal tip is near the midline, projected at about the medial aspect of the anterior horn of the left lateral ventricle.  This finding is new since the prior study.  The previously seen right frontal ventricular catheter has been removed.  It is suspected that the patient has undergone shunt revision since the prior 10/21/2015 exam.  There is low attenuation within the right cerebral hemisphere, which may be related to encephalomalacia.  This finding is predominantly within the right temporal lobe and near the junction of the right temporal, parietal, and occipital lobes (i.e., the TPO junction).  It is new since the prior exam.  Encephalomalacia also involves the bilateral frontal regions and is thought to be new since the prior study, as well.  Prominent extra-axial spaces involve the bilateral middle cranial fossae, greater on the left, seen previously.  Other prominent extra-axial spaces are seen at the level of the basal cisterns, such as involving the suprasellar cistern, the ambient cisterns, the cistern of the lamina terminalis, and the bilateral sylvian cisterns.  No acute intracranial hemorrhage is seen.  No definite acute infarct is identified.  No midline shift or acute  "intracranial herniation syndrome is seen.  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  An extra-axial catheter is seen overlying the cerebral convexities from a right parietal approach.  It crosses midline with its distal tip in the high left frontal extra-axial space, as seen on image 43 of series 202.  A similar finding was seen previously.  The patient has been extubated since 10/21/2015.  That is, the endotracheal (ET) tube has been removed.  There is slight motion artifact on the exam.  Benign external auditory canal debris is suspected.  Otherwise, the imaged middle ear clefts (that is, the bilateral mastoid air cell complexes, bilateral middle ear cavities, and bilateral external auditory canals) are well aerated.  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  Diffuse prominence of the nasopharyngeal mucosal space is seen, which is most suggestive of lymphoid hyperplasia.  No acute orbital findings are suggested.  Minimal age-indeterminate mucosal thickening involves the imaged paranasal sinuses.  No air-fluid interfaces are seen within the imaged paranasal sinuses.         1. No acute intracranial hemorrhage is identified.  2. No definite acute infarct is appreciated.  3. There is ventriculomegaly.  4. There is suspected chronic PVL.  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  5. There is a right parietal ventricular catheter in place, which is thought to represent the proximal limb of a ventriculoperitoneal () shunt.  There is suspected interval shunt revision since the prior 2015 study.  6. An extra-axial drainage catheter overlies the cerebral convexities, as discussed.  7. Bilateral cerebral hemispheric encephalomalacia is seen, new since the prior study.  8. Please note that a \"negative\" head CT does not exclude shunt malfunction.  9. Please see above comments for " further detail.    Please note that portions of this note were completed with a voice recognition program.  PERCY GILMORE JR, MD       Electronically Signed and Approved By: PERCY GILMORE JR, MD on 5/30/2023 at 7:21                  Differential Diagnosis and Discussion:    Altered Mental Status: Based on the patient's signs and symptoms, differential diagnosis includes but is not limited to meningitis, stroke, sepsis, subarachnoid hemorrhage, intracranial bleeding, encephalitis, and metabolic encephalopathy.    All labs were reviewed and interpreted by me.  All X-rays impressions were independently interpreted by me.  CT scan radiology impression was interpreted by me.    MDM         Patient Care Considerations:    MRI: I considered ordering an MRI however MRI attack his discussed this and we are uncertain about compatibility in this patient with a  shunt.      Consultants/Shared Management Plan:    Consultant: I have discussed the case with Dr. Samayoa, neurosurgery on-call at [facility] who agrees to consult on the patient.  Transfer Provider: I have discussed the case with Dr. Schulz at [facility] emergency department who agrees to accept the patient as a transfer.    Social Determinants of Health:    Patient has presented with family members who are responsible, reliable and will ensure follow up care.      Disposition and Care Coordination:    Transferred: Through independent evaluation of the patient's history, physical, and imperical data, the patient meets criteria to be transferred to another hospital for evaluation/admission.        Final diagnoses:    (ventriculoperitoneal) shunt status   Vision changes   Gait abnormality        ED Disposition     ED Disposition   Transfer to Another Facility     Condition   --    Comment   --             This medical record created using voice recognition software.           Javy Mckeon MD  05/30/23 0921

## 2023-05-30 NOTE — CONSULTS
[facility]  [facility]      Patient Name:   kody junior  YOB: 2000  Identification Number:   MRN - [id number]  Date of Service:   05/30/2023 06:57:32    Diagnosis:      •  H53.123 - Transient visual loss, bilateral.      •  R26.81 - Unsteady gait      •  G94.2 - Hydrocephalus in other diseases    Impression:        • Patient is a challenging historian but family who knows him well noted two acute issues within the past 24 hrs - bilateral visual loss complaint and gait ataxia notably worse.     He has significant ventriculomegaly and may have some features of worsening hydrocephalus when CT image today is compared with MRI report from 6 months ago.  Our recommendations are outlined below.    Recommendations:     He is due to shunt function check and given the reported acuity of change it is reasonable to complete MRI brain both to screen for any acute CNS injury / diffusion restriction and to screen for transependymal flow or other signs of shunt malfunction.     PT consult for gait safety evaluation        •  Telemetry Floor recommended but not mandatory      •  Neuro Checks      •  Bedside Swallow Eval      •  DVT Prophylaxis      •  IV Fluids, Normal Saline      •  Head of Bed 30 Degrees      •  Euglycemia and Avoid Hyperthermia (PRN Acetaminophen)    Recommended Scan:      • MRI Head Without Contrast    Therapies:      •  Physical Therapy, Occupational Therapy, Speech Therapy Assessment When Applicable    Dysphagia:      •  Swallow Evaluation, Bedside      •  NPO Until Swallow Evaluation    DVT prophylaxis:      •  Lovenox or LMW Heparin    Disposition:      •  Neurology Follow Up Recommended    Sign Out:      •  Discussed with Emergency Department Provider        ------------------------------------------------------------------------------    Metrics:  Last Known Well: 05/29/2023 08:00:00  [facility] Notification Time: 05/30/2023  "06:56:51  Arrival Time: 05/30/2023 06:48:00  Stamp Time: 05/30/2023 06:57:32  Initial Response Time: 05/30/2023 07:04:37  Symptoms: Vision loss and ataxia.  Initial patient interaction: 05/30/2023 07:06:22  NIHSS Assessment Completed: 05/30/2023 07:10:40  Patient is not a candidate for Thrombolytic.  Thrombolytic Medical Decision: 05/30/2023 07:11:00  Patient was not deemed candidate for Thrombolytic because of following reasons:  Last Well Known Above 4.5 Hours.    I personally Reviewed the CT Head and it Showed Ventriculomegaly ,  shunt in place, R temporal and L > R frontal lobe injuries , L middle cranial fossa cyst    CT head report:  \"IMPRESSION:                 1. No acute intracranial hemorrhage is identified.    2. No definite acute infarct is appreciated.    3. There is ventriculomegaly.    4. There is suspected chronic PVL.  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    5. There is a right parietal ventricular catheter in place, which is thought to represent the   proximal limb of a ventriculoperitoneal () shunt.  There is suspected interval shunt revision   since the prior 2015 study.    6. An extra-axial drainage catheter overlies the cerebral convexities, as discussed.    7. Bilateral cerebral hemispheric encephalomalacia is seen, new since the prior study.    8. Please note that a \"negative\" head CT does not exclude shunt malfunction.\"    Primary Provider Notified of Diagnostic Impression and Management Plan on: 05/30/2023 07:51:28        ------------------------------------------------------------------------------    History of Present Illness:  Patient is a 23 year old Male.    Patient was brought by private transportation with symptoms of Vision loss and ataxia.  Last well time yesterday morning roughly 24 hrs ago, yesterday and last night patient could not walk well and this morning while brushing his teeth patient complained his eyes failed on him. Both eyes went black as per " "patient description, which is challenging in setting of intellectual disability and dysarthria at baseline. Patient said he could not see and he is not clear as to whether his vision has come back or not. He tracks around the room without difficulty and has normal pupillary response on video exam. Father states gait is still notably worse than his typical today. Patient now has to hold onto walls and typically does not require a gait assist device.  Father is afraid that this worsening of chronic gait imbalance may indicate another stroke. Hx of  shunt in place, hydrocephalus since he was a baby, reportedly from father had \"stroke age 15\",      Past Medical History:      • Stroke      • Seizures  Othere PMH:  Hydrocephalus as noted above    Medications:    No Anticoagulant use   No Antiplatelet use  Reviewed EMR for current medications: keppra, vitamins    Allergies:   NKDA    Social History:  Current Employee : lives with father, no drug or alcohol use    Family History:    There is no family history of premature cerebrovascular disease pertinent to this consultation    ROS :  14 Points Review of Systems was performed and was negative except mentioned in HPI.    Past Surgical History:  There Is No Surgical History Contributory To Today’s Visit        Examination:  BP(110/81), Pulse(85),  1A: Level of Consciousness - Alert; keenly responsive + 0  1B: Ask Month and Age - Both Questions Right + 0  1C: Blink Eyes & Squeeze Hands - Performs Both Tasks + 0  2: Test Horizontal Extraocular Movements - Normal + 0  3: Test Visual Fields - No Visual Loss + 0  4: Test Facial Palsy (Use Grimace if Obtunded) - Normal symmetry + 0  5A: Test Left Arm Motor Drift - No Drift for 10 Seconds + 0  5B: Test Right Arm Motor Drift - No Drift for 10 Seconds + 0  6A: Test Left Leg Motor Drift - No Drift for 5 Seconds + 0  6B: Test Right Leg Motor Drift - No Drift for 5 Seconds + 0  7: Test Limb Ataxia (FNF/Heel-Shin) - No Ataxia + 0  8: " Test Sensation - Normal; No sensory loss + 0  9: Test Language/Aphasia - Mild-Moderate Aphasia: Some Obvious Changes, Without Significant Limitation + 1  10: Test Dysarthria - Mild-Moderate Dysarthria: Slurring but can be understood + 1  11: Test Extinction/Inattention - No abnormality + 0    NIHSS Score: 2  NIHSS Free Text : Scanning speech, baseline mild dysarthria, intellectual disability, bright affect, b/l gaze intact but does not track smoothly may have abnormal smooth pursuit, registers visual stimuli both sides of face but may have tunneling of visual field b/l temporal loss, gait is very wide and unsteady holding on to objects in order to walk    Pre-Morbid Modified McDonald Scale:  3 Points = Moderate disability; requiring some help, but able to walk without assistance    I reviewed the available imaging via A.I. software Rapid and initiated discussion with the primary provider    Patient/Family was informed the Neurology Consult would occur via TeleHealth consult by way of interactive audio and video telecommunications and consented to receiving care in this manner.      Patient is being evaluated for possible acute neurologic impairment and high probability of imminent or life-threatening deterioration. I spent total of 62 minutes providing care to this patient, including time for face to face visit via telemedicine, review of medical records, imaging studies and discussion of findings with providers, the patient and/or family.      Dr Mechelle Lynne      [facility]  [phone]    Case [id number]

## 2023-06-03 LAB — QT INTERVAL: 380 MS
